# Patient Record
Sex: MALE | Race: WHITE | HISPANIC OR LATINO | Employment: OTHER | URBAN - METROPOLITAN AREA
[De-identification: names, ages, dates, MRNs, and addresses within clinical notes are randomized per-mention and may not be internally consistent; named-entity substitution may affect disease eponyms.]

---

## 2020-02-11 ENCOUNTER — HOSPITAL ENCOUNTER (EMERGENCY)
Facility: HOSPITAL | Age: 30
Discharge: HOME | End: 2020-02-11
Attending: EMERGENCY MEDICINE

## 2020-02-11 VITALS
WEIGHT: 195 LBS | DIASTOLIC BLOOD PRESSURE: 70 MMHG | SYSTOLIC BLOOD PRESSURE: 128 MMHG | TEMPERATURE: 98 F | RESPIRATION RATE: 18 BRPM | HEART RATE: 66 BPM | OXYGEN SATURATION: 99 %

## 2020-02-11 DIAGNOSIS — S69.91XA INJURY OF FINGER OF RIGHT HAND, INITIAL ENCOUNTER: Primary | ICD-10-CM

## 2020-02-11 PROCEDURE — 99283 EMERGENCY DEPT VISIT LOW MDM: CPT | Mod: 25

## 2020-02-11 PROCEDURE — 25000003 PHARM REV CODE 250: Performed by: NURSE PRACTITIONER

## 2020-02-11 PROCEDURE — 29130 APPL FINGER SPLINT STATIC: CPT | Mod: F8

## 2020-02-11 RX ORDER — IBUPROFEN 400 MG/1
400 TABLET ORAL
Status: COMPLETED | OUTPATIENT
Start: 2020-02-11 | End: 2020-02-11

## 2020-02-11 RX ADMIN — IBUPROFEN 400 MG: 400 TABLET, FILM COATED ORAL at 07:02

## 2020-02-11 NOTE — ED NOTES
patient was instructed not to bend the static splint  straight out to keep it in the  curved position.  Patients right 4 th digit was supported with Kerlix in a cup holding position.  Patient verbalized understanding

## 2020-02-11 NOTE — ED PROVIDER NOTES
Encounter Date: 2/11/2020    SCRIBE #1 NOTE: I, Cecilia Sanchez, am scribing for, and in the presence of,  Canelo Villagomez DNP. I have scribed the following portions of the note - Other sections scribed: HPI, ROS, MDM.       History     Chief Complaint   Patient presents with    Hand Injury     patient reports falling last night and possibly fracturing index finger on right hand. Rates pain 8/10 described as stabbing. patient took tylenol last at 2030. patient can move finger.     Time seen by provider: 7:11 AM    29 year old male patient presents to the ED complaining of right ring finger pain onset 2000 yesterday night status post mechanical fall. The patient reports that he was walking down the stairs when he slipped and injured his hand. He denies any head trauma, back pain, or any other injuries related to the fall. He states concern that his finger might be fractured.    The history is provided by the patient. A  was used (DataVote ID: [id number]).     Review of patient's allergies indicates:  No Known Allergies  History reviewed. No pertinent past medical history.  History reviewed. No pertinent surgical history.  History reviewed. No pertinent family history.  Social History     Tobacco Use    Smoking status: Current Every Day Smoker   Substance Use Topics    Alcohol use: Yes    Drug use: Never     Review of Systems   Constitutional: Negative for appetite change, chills, diaphoresis, fatigue and fever.   HENT: Negative for congestion, ear discharge, ear pain, postnasal drip, rhinorrhea, sinus pressure, sneezing, sore throat and voice change.    Eyes: Negative for discharge, itching and visual disturbance.   Respiratory: Negative for cough, shortness of breath and wheezing.    Cardiovascular: Negative for chest pain, palpitations and leg swelling.   Gastrointestinal: Negative for abdominal pain, nausea and vomiting.   Endocrine: Negative for polydipsia, polyphagia and polyuria.    Genitourinary: Negative for difficulty urinating, discharge, dysuria, frequency, hematuria, penile pain, penile swelling and urgency.   Musculoskeletal: Negative for arthralgias, back pain and myalgias.        (+) Right ring finger pain   Skin: Negative for rash and wound.   Neurological: Negative for dizziness, seizures, syncope and weakness.   Hematological: Negative for adenopathy. Does not bruise/bleed easily.   Psychiatric/Behavioral: Negative for agitation and self-injury. The patient is not nervous/anxious.        Physical Exam     Initial Vitals [02/11/20 0655]   BP Pulse Resp Temp SpO2   131/76 67 20 98.3 °F (36.8 °C) 99 %      MAP       --         Physical Exam    Nursing note and vitals reviewed.  Constitutional: He appears well-developed and well-nourished. He is not diaphoretic. No distress.   HENT:   Head: Normocephalic and atraumatic.   Right Ear: External ear normal.   Left Ear: External ear normal.   Nose: Nose normal.   Eyes: Pupils are equal, round, and reactive to light. Right eye exhibits no discharge. Left eye exhibits no discharge. No scleral icterus.   Neck: Normal range of motion.   Pulmonary/Chest: No respiratory distress.   Abdominal: He exhibits no distension.   Musculoskeletal: Normal range of motion.        Right hand: Decreased sensation is not present in the ulnar distribution, is not present in the medial distribution and is not present in the radial distribution. Right ring finger: Exhibits tenderness. Motor /Testing: Ring Finger: 5/5.   Neurological: He is alert and oriented to person, place, and time.   Skin: Skin is dry. Capillary refill takes less than 2 seconds.         ED Course   Splint Application  Date/Time: 2/11/2020 4:17 PM  Performed by: Canelo Villagomez DNP  Authorized by: Glenn Rockwell MD   Location details: right ring finger  Splint type: static finger  Supplies used: aluminum splint and elastic bandage  Post-procedure: The splinted body part was  neurovascularly unchanged following the procedure.  Patient tolerance: Patient tolerated the procedure well with no immediate complications        Labs Reviewed - No data to display       Imaging Results          X-Ray Hand 3 view Right (Final result)  Result time 02/11/20 08:34:52    Final result by Jason Ferreira MD (02/11/20 08:34:52)                 Impression:      Nonspecific soft tissue swelling 4th digit.  No fracture dislocation.      Electronically signed by: Jason Ferreira MD  Date:    02/11/2020  Time:    08:34             Narrative:    EXAMINATION:  XR HAND COMPLETE 3 VIEW RIGHT    CLINICAL HISTORY:  4th finger injury;    TECHNIQUE:  PA, lateral, and oblique views of the right hand were performed.    COMPARISON:  None    FINDINGS:  Soft tissue swelling 4th digit, with normal bone and joint structures.                                 Medical Decision Making:   Initial Assessment:   29 year old male patient presents to the ED complaining of right ring finger pain onset 2000 yesterday night status post mechanical fall. I will order an x ray and reassess the patient once I have reviewed the results.  Differential Diagnosis:   My differential diagnosis includes finger fracture, dislocation, sprain, strain, and septic joint.  Clinical Tests:   Radiological Study: Ordered and Reviewed            Scribe Attestation:   Scribe #1: I performed the above scribed service and the documentation accurately describes the services I performed. I attest to the accuracy of the note.            ED Course as of Feb 11 1618   Tue Feb 11, 2020   0839   Nonspecific soft tissue swelling 4th digit.  No fracture dislocation.   X-Ray Hand 3 view Right [VC]      ED Course User Index  [VC] Canelo Villagomez DNP     Patient was discharged home in good condition to follow up with primary care provider.  He should return for any worsening or changes condition. Symptomatic therapies and return precautions on AVS.    Medication choices were made after reviewing allergies, medications, history, available laboratories.            Clinical Impression:       ICD-10-CM ICD-9-CM   1. Injury of finger of right hand, initial encounter S69.91XA 959.5         Disposition:   Disposition: Discharged  Condition: Stable    ADRIEN PITTS, NIEVES ACNP-BC FNP-C, personally performed the services described in this documentation. All medical record entries made by the scribe were at my direction and in my presence. I have reviewed the chart and agree that the record reflects my personal performance and is accurate and complete.                 Canelo Villagomez, NIEVES  02/11/20 5518

## 2020-02-11 NOTE — DISCHARGE INSTRUCTIONS
Reilly radiografía fue negativa por fractura o dislocación. La férula es solo para comodidad y protección, puede quitarla cuando lo desee. Tylenol e ibuprofeno para el dolor. Descanse la mano, levántela con frecuencia, hielo alex 15 minutos 4-5 veces al día.    Your x-ray was negative for fracture or dislocation.  Splint is for comfort and protection only, you may remove it whenever you wish. Tylenol and ibuprofen for pain.  Rest the hand, elevate it frequently, ice for 15min 4-5x a day.